# Patient Record
Sex: FEMALE | Race: WHITE | URBAN - METROPOLITAN AREA
[De-identification: names, ages, dates, MRNs, and addresses within clinical notes are randomized per-mention and may not be internally consistent; named-entity substitution may affect disease eponyms.]

---

## 2023-04-05 ENCOUNTER — APPOINTMENT (OUTPATIENT)
Dept: LAB | Facility: LAB | Age: 28
End: 2023-04-05
Payer: COMMERCIAL

## 2023-04-05 LAB
ALANINE AMINOTRANSFERASE (SGPT) (U/L) IN SER/PLAS: 12 U/L (ref 7–45)
ALBUMIN (G/DL) IN SER/PLAS: 4.5 G/DL (ref 3.4–5)
ALKALINE PHOSPHATASE (U/L) IN SER/PLAS: 60 U/L (ref 33–110)
ANION GAP IN SER/PLAS: 16 MMOL/L (ref 10–20)
ASPARTATE AMINOTRANSFERASE (SGOT) (U/L) IN SER/PLAS: 18 U/L (ref 9–39)
BILIRUBIN TOTAL (MG/DL) IN SER/PLAS: 0.4 MG/DL (ref 0–1.2)
CALCIUM (MG/DL) IN SER/PLAS: 9.7 MG/DL (ref 8.6–10.6)
CARBON DIOXIDE, TOTAL (MMOL/L) IN SER/PLAS: 21 MMOL/L (ref 21–32)
CHLORIDE (MMOL/L) IN SER/PLAS: 104 MMOL/L (ref 98–107)
CREATININE (MG/DL) IN SER/PLAS: 0.71 MG/DL (ref 0.5–1.05)
DEHYDROEPIANDROSTERONE SULFATE (DHEA-S) (UG/DL) IN SER/: 103 UG/DL (ref 65–395)
ERYTHROCYTE DISTRIBUTION WIDTH (RATIO) BY AUTOMATED COUNT: 12.1 % (ref 11.5–14.5)
ERYTHROCYTE MEAN CORPUSCULAR HEMOGLOBIN CONCENTRATION (G/DL) BY AUTOMATED: 34.4 G/DL (ref 32–36)
ERYTHROCYTE MEAN CORPUSCULAR VOLUME (FL) BY AUTOMATED COUNT: 85 FL (ref 80–100)
ERYTHROCYTES (10*6/UL) IN BLOOD BY AUTOMATED COUNT: 4.43 X10E12/L (ref 4–5.2)
ESTRADIOL (PG/ML) IN SER/PLAS: 30 PG/ML
FOLLITROPIN (IU/L) IN SER/PLAS: 5.4 IU/L
GFR FEMALE: >90 ML/MIN/1.73M2
GLUCOSE (MG/DL) IN SER/PLAS: 93 MG/DL (ref 74–99)
HEMATOCRIT (%) IN BLOOD BY AUTOMATED COUNT: 37.5 % (ref 36–46)
HEMOGLOBIN (G/DL) IN BLOOD: 12.9 G/DL (ref 12–16)
LEUKOCYTES (10*3/UL) IN BLOOD BY AUTOMATED COUNT: 5.6 X10E9/L (ref 4.4–11.3)
LUTEINIZING HORMONE (IU/ML) IN SER/PLAS: 6.5 IU/L
NRBC (PER 100 WBCS) BY AUTOMATED COUNT: 0 /100 WBC (ref 0–0)
PLATELETS (10*3/UL) IN BLOOD AUTOMATED COUNT: 287 X10E9/L (ref 150–450)
POTASSIUM (MMOL/L) IN SER/PLAS: 3.6 MMOL/L (ref 3.5–5.3)
PROGESTERONE (NG/ML) IN SER/PLAS: 0.3 NG/ML
PROTEIN TOTAL: 7.4 G/DL (ref 6.4–8.2)
SODIUM (MMOL/L) IN SER/PLAS: 137 MMOL/L (ref 136–145)
THYROTROPIN (MIU/L) IN SER/PLAS BY DETECTION LIMIT <= 0.05 MIU/L: 4.7 MIU/L (ref 0.44–3.98)
THYROXINE (T4) FREE (NG/DL) IN SER/PLAS: 1.13 NG/DL (ref 0.78–1.48)
TRIIODOTHYRONINE (T3) FREE (PG/ML) IN SER/PLAS: 3.2 PG/ML (ref 2.3–4.2)
UREA NITROGEN (MG/DL) IN SER/PLAS: 18 MG/DL (ref 6–23)

## 2023-05-04 ENCOUNTER — APPOINTMENT (OUTPATIENT)
Dept: LAB | Facility: LAB | Age: 28
End: 2023-05-04
Payer: COMMERCIAL

## 2023-05-04 LAB
ESTIMATED AVERAGE GLUCOSE FOR HBA1C: 100 MG/DL
ESTRADIOL (PG/ML) IN SER/PLAS: 281 PG/ML
HEMOGLOBIN A1C/HEMOGLOBIN TOTAL IN BLOOD: 5.1 %
IGG (MG/DL) IN SER/PLAS: 1120 MG/DL (ref 700–1600)
PROGESTERONE (NG/ML) IN SER/PLAS: 17 NG/ML
PROLACTIN (UG/L) IN SER/PLAS: 13.1 UG/L (ref 3–20)
THYROTROPIN (MIU/L) IN SER/PLAS BY DETECTION LIMIT <= 0.05 MIU/L: 2.99 MIU/L (ref 0.44–3.98)

## 2023-05-08 LAB
THYROGLOBULIN AB (IU/ML) IN SER/PLAS: <0.9 IU/ML (ref 0–4)
THYROGLOBULIN LC-MS/MS: ABNORMAL NG/ML (ref 1.3–31.8)
THYROGLOBULIN: 33.2 NG/ML (ref 1.3–31.8)

## 2023-05-11 LAB — THYROID STIMULATING IMMUNOGLOBULIN: <1 TSI INDEX

## 2023-08-29 PROBLEM — N94.10 DYSPAREUNIA, FEMALE: Status: ACTIVE | Noted: 2023-08-29

## 2023-08-29 PROBLEM — N92.6 MENSTRUAL PERIODS IRREGULAR: Status: ACTIVE | Noted: 2023-08-29

## 2023-08-29 PROBLEM — E22.1 HYPERPROLACTINEMIA (MULTI): Status: ACTIVE | Noted: 2023-08-29

## 2023-08-29 RX ORDER — FEXOFENADINE HCL 60 MG
1 TABLET ORAL DAILY
COMMUNITY
Start: 2012-09-18 | End: 2023-10-06 | Stop reason: ALTCHOICE

## 2023-08-29 RX ORDER — TACROLIMUS 1 MG/G
OINTMENT TOPICAL NIGHTLY
COMMUNITY
Start: 2014-02-27 | End: 2023-10-06 | Stop reason: ALTCHOICE

## 2023-08-29 RX ORDER — KETOCONAZOLE 20 MG/G
CREAM TOPICAL DAILY
COMMUNITY
Start: 2014-02-27 | End: 2023-10-06 | Stop reason: ALTCHOICE

## 2023-08-29 RX ORDER — TRIAMCINOLONE ACETONIDE 1 MG/G
OINTMENT TOPICAL
COMMUNITY
Start: 2014-02-27 | End: 2023-10-06 | Stop reason: ALTCHOICE

## 2023-10-03 PROBLEM — L20.9 ATOPIC DERMATITIS: Status: ACTIVE | Noted: 2023-10-03

## 2023-10-06 ENCOUNTER — OFFICE VISIT (OUTPATIENT)
Dept: OBSTETRICS AND GYNECOLOGY | Facility: CLINIC | Age: 28
End: 2023-10-06
Payer: COMMERCIAL

## 2023-10-06 VITALS
BODY MASS INDEX: 19.63 KG/M2 | WEIGHT: 115 LBS | HEIGHT: 64 IN | DIASTOLIC BLOOD PRESSURE: 84 MMHG | SYSTOLIC BLOOD PRESSURE: 120 MMHG

## 2023-10-06 DIAGNOSIS — Z31.69 ENCOUNTER FOR PRECONCEPTION CONSULTATION: Primary | ICD-10-CM

## 2023-10-06 PROCEDURE — 36415 COLL VENOUS BLD VENIPUNCTURE: CPT

## 2023-10-06 PROCEDURE — 86787 VARICELLA-ZOSTER ANTIBODY: CPT

## 2023-10-06 PROCEDURE — 1036F TOBACCO NON-USER: CPT | Performed by: ADVANCED PRACTICE MIDWIFE

## 2023-10-06 PROCEDURE — 99203 OFFICE O/P NEW LOW 30 MIN: CPT | Performed by: ADVANCED PRACTICE MIDWIFE

## 2023-10-06 PROCEDURE — 86317 IMMUNOASSAY INFECTIOUS AGENT: CPT

## 2023-10-06 ASSESSMENT — ENCOUNTER SYMPTOMS
MUSCULOSKELETAL NEGATIVE: 0
HEMATOLOGIC/LYMPHATIC NEGATIVE: 0
EYES NEGATIVE: 0
PSYCHIATRIC NEGATIVE: 0
ENDOCRINE NEGATIVE: 0
NEUROLOGICAL NEGATIVE: 0
GASTROINTESTINAL NEGATIVE: 0
CARDIOVASCULAR NEGATIVE: 0
CONSTITUTIONAL NEGATIVE: 0
RESPIRATORY NEGATIVE: 0
ALLERGIC/IMMUNOLOGIC NEGATIVE: 0

## 2023-10-06 NOTE — PROGRESS NOTES
"Patient being seen to check her vitamin levels prior to pregnancy.   Patient would like to make sure she is healthy prior to conceiving.   Patient stated she has never been pregnant in the past.     Last PAP: 9/28/23 normal   LMP: 9/11/23    Lauren Rojo MA    CC preconception counseling    HPI:  27 yo G0 here today to discuss pregnancy  Periods started at age 13, never normal q 27-39 days has 10 periods a year normal flow no pain   x 2 years  1 partner in lifetime  No h/o STI  Dx with PCOS per primary : normal labs, normal ultrasound dx based on \"facial hair and irregular periods\"  Works as a transational genetic  graduated from [employer]  PE:        Constitutional: Alert and oriented, cooperative, no acute distress, well nourished, well hydrated       HEENT: normal     Peripheral Vascular: no edema or varicosities      Neurologic: normal tone and sensation     Neuropsych: normal affect, well groomed, good eye contact            A: Irregular periods      Preconception counseling  P: Discussed ovulation. Pt does NFP reports ovulation most months      Prenatal vitamin daily      CB, Varicella and rubella drawn today      Avoiding toxins ( etoh, smoke, drugs reviewed)      Discussed health maintenance, diet , exercise      Call with first positive pregnancy test                                        "

## 2023-10-07 LAB
RUBV IGG SERPL IA-ACNC: 1.8 IA
RUBV IGG SERPL QL IA: POSITIVE

## 2023-10-07 PROCEDURE — 36415 COLL VENOUS BLD VENIPUNCTURE: CPT | Performed by: ADVANCED PRACTICE MIDWIFE

## 2023-10-10 LAB — VZV IGM SER IA-ACNC: 0 ISR

## 2023-10-19 ENCOUNTER — TELEPHONE (OUTPATIENT)
Dept: OBSTETRICS AND GYNECOLOGY | Facility: CLINIC | Age: 28
End: 2023-10-19
Payer: COMMERCIAL

## 2025-03-06 ENCOUNTER — OFFICE VISIT (OUTPATIENT)
Facility: CLINIC | Age: 30
End: 2025-03-06
Payer: COMMERCIAL

## 2025-03-06 VITALS
DIASTOLIC BLOOD PRESSURE: 98 MMHG | SYSTOLIC BLOOD PRESSURE: 145 MMHG | HEIGHT: 64 IN | BODY MASS INDEX: 18.64 KG/M2 | WEIGHT: 109.2 LBS

## 2025-03-06 DIAGNOSIS — N64.4 CYCLICAL BREAST PAIN: Primary | ICD-10-CM

## 2025-03-06 PROCEDURE — 1036F TOBACCO NON-USER: CPT

## 2025-03-06 PROCEDURE — 3008F BODY MASS INDEX DOCD: CPT

## 2025-03-06 PROCEDURE — 99213 OFFICE O/P EST LOW 20 MIN: CPT

## 2025-03-06 ASSESSMENT — PATIENT HEALTH QUESTIONNAIRE - PHQ9
1. LITTLE INTEREST OR PLEASURE IN DOING THINGS: NOT AT ALL
SUM OF ALL RESPONSES TO PHQ9 QUESTIONS 1 & 2: 0
2. FEELING DOWN, DEPRESSED OR HOPELESS: NOT AT ALL

## 2025-03-06 NOTE — PROGRESS NOTES
"Assessment/Plan   Diagnoses and all orders for this visit:  Cyclical breast pain  - nml breast exam today, reassurance provided that cyclic breast pain is physiologically normal  - discussed importance of wearing a proper-fitted bra. Encouraged caffeine reduction. May try evening primrose oil supplement daily, although minimal evidence to support this.  - NSAID and/or acetaminophen for symptomatic relief  - Pt declines trial of OCP's as she is planning for pregnancy in the near future. Encouraged continued PNV daily.     RTO yearly for annual exam, or sooner as needed.    SHI Laguna-DOMINIQUE Hardin Barbara Cordero is a 29 y.o. female presenting for cyclic breast pain x3 mo.  She shares that she has a known history of fibrocystic breast disease.  She experiences breast tenderness for approximately two weeks beginning mid-cycle and ending once she starts her menses. Rates it as a 3/10 severity, \"nagging\" but not bothersome enough to take medication for. Just wants to make sure this is normal.     She is considering trying for pregnancy this year. She and her  utilize natural family planning. She is taking a daily PNV.     Objective     BP (!) 145/98 (BP Location: Left arm, Patient Position: Sitting, BP Cuff Size: Small adult)   Ht 1.626 m (5' 4\")   Wt 49.5 kg (109 lb 3.2 oz)   LMP 02/25/2025 (Exact Date)   BMI 18.74 kg/m²     Physical Exam  Vitals reviewed.   Constitutional:       General: She is not in acute distress.     Appearance: Normal appearance.   HENT:      Head: Normocephalic and atraumatic.   Pulmonary:      Effort: Pulmonary effort is normal.   Chest:   Breasts:     Breasts are symmetrical.      Right: Normal. No swelling, bleeding, inverted nipple, mass, nipple discharge, skin change or tenderness.      Left: Normal. No swelling, bleeding, inverted nipple, mass, nipple discharge, skin change or tenderness.   Musculoskeletal:         General: Normal range of motion.      " Cervical back: Normal range of motion.   Neurological:      Mental Status: She is alert and oriented to person, place, and time.   Psychiatric:         Mood and Affect: Mood normal.         Behavior: Behavior normal.         Thought Content: Thought content normal.         Judgment: Judgment normal.

## 2025-09-11 ENCOUNTER — APPOINTMENT (OUTPATIENT)
Facility: CLINIC | Age: 30
End: 2025-09-11
Payer: COMMERCIAL